# Patient Record
Sex: MALE | Race: WHITE | Employment: STUDENT | URBAN - METROPOLITAN AREA
[De-identification: names, ages, dates, MRNs, and addresses within clinical notes are randomized per-mention and may not be internally consistent; named-entity substitution may affect disease eponyms.]

---
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
## 2024-02-01 ENCOUNTER — APPOINTMENT (OUTPATIENT)
Dept: GENERAL RADIOLOGY | Age: 24
End: 2024-02-01
Attending: EMERGENCY MEDICINE
Payer: COMMERCIAL

## 2024-02-01 ENCOUNTER — HOSPITAL ENCOUNTER (OUTPATIENT)
Age: 24
Discharge: HOME | End: 2024-02-01
Attending: EMERGENCY MEDICINE
Payer: COMMERCIAL

## 2024-02-01 VITALS
WEIGHT: 150 LBS | OXYGEN SATURATION: 97 % | DIASTOLIC BLOOD PRESSURE: 87 MMHG | HEART RATE: 100 BPM | SYSTOLIC BLOOD PRESSURE: 136 MMHG | HEIGHT: 68 IN | TEMPERATURE: 99 F | RESPIRATION RATE: 20 BRPM | BODY MASS INDEX: 22.73 KG/M2

## 2024-02-01 DIAGNOSIS — S93.409A: Primary | ICD-10-CM

## 2024-02-01 PROCEDURE — 29515 APPLICATION SHORT LEG SPLINT: CPT

## 2024-02-01 PROCEDURE — 99204 OFFICE O/P NEW MOD 45 MIN: CPT

## 2024-02-01 PROCEDURE — 73610 X-RAY EXAM OF ANKLE: CPT | Performed by: EMERGENCY MEDICINE

## 2024-02-01 RX ORDER — HYDROCODONE BITARTRATE AND ACETAMINOPHEN 5; 325 MG/1; MG/1
1-2 TABLET ORAL EVERY 6 HOURS PRN
Qty: 10 TABLET | Refills: 0 | Status: SHIPPED | OUTPATIENT
Start: 2024-02-01 | End: 2024-02-06

## 2024-02-01 NOTE — ED INITIAL ASSESSMENT (HPI)
Patient c/o injury to right ankle pain and swelling. Patient sustained injury while jumping during a [employer]is[employer] game.

## 2024-02-01 NOTE — ED PROVIDER NOTES
Patient Seen in: [facility]      History     Chief Complaint   Patient presents with    Ankle Injury     Stated Complaint: Ankle Injury    Subjective:   HPI    Patient complains of injury to the right ankle with pain and swelling there.  He jumped and landed with his foot inverting under him during a Frisbee game.  Complains of pain with swelling and bruising noted over the lateral aspect of the ankle and limited weightbearing.  Patient notes that he had an injury to this ankle just a few weeks ago.    Objective:   Past Medical History:   Diagnosis Date    Asthma     Extrinsic asthma, unspecified               Past Surgical History:   Procedure Laterality Date    HC IMPLANT EAR TUBES      REMOVAL ADENOIDS,PRIMARY,<13 Y/O                  Social History     Socioeconomic History    Marital status: Single   Tobacco Use    Smoking status: Never    Smokeless tobacco: Never   Substance and Sexual Activity    Alcohol use: No    Drug use: No              Review of Systems    Positive for stated complaint: Ankle Injury  Other systems are as noted in HPI.  Constitutional and vital signs reviewed.      All other systems reviewed and negative except as noted above.    Physical Exam     ED Triage Vitals [02/01/24 1743]   /87   Pulse 100   Resp 20   Temp 98.6 °F (37 °C)   Temp src Oral   SpO2 97 %   O2 Device None (Room air)       Current:/87   Pulse 100   Temp 98.6 °F (37 °C) (Oral)   Resp 20   Ht 172.7 cm (5' 8\")   Wt 68 kg   SpO2 97%   BMI 22.81 kg/m²         Physical Exam  Ankle: On inspection, there is soft tissue swelling noted over the lateral malleolus and just inferior and anterior to this.  The medial malleolus, Achilles, calcaneus, proximal fifth metatarsal, and proximal fibula are nontender.  Dorsalis pedis pulse intact.  Discomfort intact.  Distal sensation intact light touch       ED Course   Labs Reviewed - No data to display                   MDM     Patient has sustained an  inversion injury to the ankle.  He has significant swelling, tenderness, and limited weightbearing this is at least 1/3 degree sprain. .  Fracture include the differential    Patient offered pain medication      X-ray ankle  IMPRESSION:   There is likely an accessory ossicle adjacent to the caudal aspect of the lateral malleolus.  This is unlikely to represent an avulsion fracture.  Soft tissue swelling over the lateral malleolus.       I recommend splinting, elevation, cool compresses, crutches with no weightbearing.  I recommend orthopedic follow-up.  Patient should call tomorrow for an appointment to be seen next week.    A stirrup splint was applied and neurovascular status assessed afterwards was normal.  Splint was applied for purposes of immobilization to facilitate healing and patient comfort                           Medical Decision Making      Disposition and Plan     Clinical Impression:  1. Third degree ankle sprain         Disposition:  Discharge  2/1/2024  6:18 pm    Follow-up:  David Francisco PA  [address] [place] [address]  [phone]    Call in 1 day            Medications Prescribed:  Current Discharge Medication List        START taking these medications    Details   HYDROcodone-acetaminophen 5-325 MG Oral Tab Take 1-2 tablets by mouth every 6 (six) hours as needed for Pain.  Qty: 10 tablet, Refills: 0    Associated Diagnoses: Third degree ankle sprain

## 2024-02-02 NOTE — DISCHARGE INSTRUCTIONS
Crutches with no weightbearing  Rest  Elevate your injured extremity  Apply cool compresses for 20 minutes at a time.  Tylenol or motrin for pain  Norco for severe pain  Follow up with orthopedic specialist.  Call tomorrow for an appointment to be seen next week

## 2024-02-06 ENCOUNTER — OFFICE VISIT (OUTPATIENT)
Dept: ORTHOPEDICS CLINIC | Facility: CLINIC | Age: 24
End: 2024-02-06
Payer: COMMERCIAL

## 2024-02-06 ENCOUNTER — HOSPITAL ENCOUNTER (OUTPATIENT)
Dept: GENERAL RADIOLOGY | Age: 24
Discharge: HOME | End: 2024-02-06
Attending: PODIATRIST
Payer: COMMERCIAL

## 2024-02-06 VITALS — BODY MASS INDEX: 22.73 KG/M2 | WEIGHT: 150 LBS | HEIGHT: 68 IN

## 2024-02-06 DIAGNOSIS — S93.491A HIGH ANKLE SPRAIN OF RIGHT LOWER EXTREMITY, INITIAL ENCOUNTER: ICD-10-CM

## 2024-02-06 DIAGNOSIS — S82.831A OTHER CLOSED FRACTURE OF DISTAL END OF RIGHT FIBULA, INITIAL ENCOUNTER: Primary | ICD-10-CM

## 2024-02-06 DIAGNOSIS — S82.831A OTHER CLOSED FRACTURE OF DISTAL END OF RIGHT FIBULA, INITIAL ENCOUNTER: ICD-10-CM

## 2024-02-06 DIAGNOSIS — S93.421A SPRAIN OF DELTOID LIGAMENT OF RIGHT ANKLE, INITIAL ENCOUNTER: ICD-10-CM

## 2024-02-06 PROCEDURE — 73600 X-RAY EXAM OF ANKLE: CPT | Performed by: PODIATRIST

## 2024-02-06 PROCEDURE — 3008F BODY MASS INDEX DOCD: CPT | Performed by: PODIATRIST

## 2024-02-06 PROCEDURE — 99204 OFFICE O/P NEW MOD 45 MIN: CPT | Performed by: PODIATRIST

## 2024-02-06 NOTE — PROGRESS NOTES
[facility] New Patient Note    CC:   Chief Complaint   Patient presents with    Leg or Foot Injury     Right ankle injury;   No real explanation whether it is a fx or old/growth thing;   DOI: 02/01/24;  Playing ultimate frisbee jumped and landed funny;   Pain Score: 4-5;  Non WB        HPI: The patient is a 23 year old male who presents today with complaints of an injury about 5 days ago when he was playing ultimate Frisbee.  He jumped up to grab the Upsidebee and came down on someone else's foot.  He was seen at an urgent care and is here for follow-up.  He has been nonweightbearing with a James splint and crutches.    Recent 6 months ago, ankle sprain of the same ankle  No PT  Hx of ankle sprains     Past Medical History:   Diagnosis Date    Asthma     Extrinsic asthma, unspecified      Past Surgical History:   Procedure Laterality Date    HC IMPLANT EAR TUBES      REMOVAL ADENOIDS,PRIMARY,<11 Y/O       Current Outpatient Medications   Medication Sig Dispense Refill    ibuprofen 400 MG Oral Tab Take 1 tablet (400 mg total) by mouth every 6 (six) hours as needed for Pain.      Albuterol Sulfate HFA (PROAIR HFA) 108 (90 BASE) MCG/ACT Inhalation Aero Soln Inhale 2 puffs into the lungs every 4 (four) hours as needed for Wheezing. 1 Inhaler 0    HYDROcodone-acetaminophen 5-325 MG Oral Tab Take 1-2 tablets by mouth every 6 (six) hours as needed for Pain. (Patient not taking: Reported on 2/6/2024) 10 tablet 0     No Known Allergies  Family History   Problem Relation Age of Onset    Cancer Maternal Grandmother     Heart Disease Maternal Grandmother     Stroke Neg      Social History     Occupational History    Not on file   Tobacco Use    Smoking status: Never    Smokeless tobacco: Never   Substance and Sexual Activity    Alcohol use: No    Drug use: No    Sexual activity: Not on file        ROS:  Complete ROS reviewed by me and non-contributory to the chief complaint except as mentioned above.    Physical Exam:     Ht 5' 8\" (1.727 m)   Wt 150 lb (68 kg)   BMI 22.81 kg/m²   Exam right foot no pain  Exam right ankle  Pain at anterior inferior syndesmotic ligament   Pain medially, at deltoid fibers  Pain at the distal fibula    Moderate swelling and ecchymosis   No pain proximally of leg, no calf pain  No pain with squeezing of distal tibia/fibula  Strength Testing deferred  Palpable pedal pulses and foot is warm.  He can feel light touch to the toes    Imaging:  avulsion fragment distal fibula, 3 views right ankle  Subtle widening medial gutter    Stress view right ankle -no widening of the medial clear space with stress       personally viewed, independently interpreted and radiology report read.      Assessment/Diagnoses:  Diagnoses and all orders for this visit:    Other closed fracture of distal end of right fibula, initial encounter  -     Cancel: XR ANKLE (MIN 3 VIEWS), RIGHT (CPT=73610); Future    High ankle sprain of right lower extremity, initial encounter    Sprain of deltoid ligament of right ankle, initial encounter        Plan:  I reviewed imaging and exam findings with the patient and his dad with him today.  He has a high ankle sprain and the avulsion fragment off the distal fibula.  He also has a medial deltoid sprain and we looked at the x-rays together as well as the stress view together which shows no increased widening medially of the clear space.  This is a non surgical treatment now  The type of fracture is discussed with the patient including anatomy, etiology, and location.  This can be treated conservatively with immobilization in a high profile boot they have at home  Weight bearing status discussed -WB as tolerated  Time to healing up to 6-8 weeks is anticipated.  Discussed delayed healing and potential for non union, arthritis, and continued pain.      Will plan for PT for ankle rehab  Hx of frequent sprains in past of this same ankle    Elevation, use of ace wrap and icing  Nsaids use as  well          They have a boot(high) type at home that he can go into now    Follow up in 1 month, 3 view xrays of ankle - NWB views for comparison          Susan Larios DPM  [facility]      This document was partially prepared using Dragon Medical voice recognition software.

## 2024-03-04 ENCOUNTER — TELEPHONE (OUTPATIENT)
Dept: ORTHOPEDICS CLINIC | Facility: CLINIC | Age: 24
End: 2024-03-04

## 2024-03-04 DIAGNOSIS — M25.571 RIGHT ANKLE PAIN, UNSPECIFIED CHRONICITY: Primary | ICD-10-CM

## 2024-03-05 ENCOUNTER — HOSPITAL ENCOUNTER (OUTPATIENT)
Dept: GENERAL RADIOLOGY | Age: 24
Discharge: HOME | End: 2024-03-05
Attending: PODIATRIST
Payer: COMMERCIAL

## 2024-03-05 ENCOUNTER — OFFICE VISIT (OUTPATIENT)
Dept: ORTHOPEDICS CLINIC | Facility: CLINIC | Age: 24
End: 2024-03-05
Payer: COMMERCIAL

## 2024-03-05 VITALS — HEIGHT: 68 IN | WEIGHT: 150 LBS | BODY MASS INDEX: 22.73 KG/M2

## 2024-03-05 DIAGNOSIS — T14.8XXA AVULSION FRACTURE: ICD-10-CM

## 2024-03-05 DIAGNOSIS — S93.421A SPRAIN OF DELTOID LIGAMENT OF RIGHT ANKLE, INITIAL ENCOUNTER: Primary | ICD-10-CM

## 2024-03-05 DIAGNOSIS — M25.571 RIGHT ANKLE PAIN, UNSPECIFIED CHRONICITY: ICD-10-CM

## 2024-03-05 PROCEDURE — 73610 X-RAY EXAM OF ANKLE: CPT | Performed by: PODIATRIST

## 2024-03-05 PROCEDURE — 3008F BODY MASS INDEX DOCD: CPT | Performed by: PODIATRIST

## 2024-03-05 PROCEDURE — 99213 OFFICE O/P EST LOW 20 MIN: CPT | Performed by: PODIATRIST

## 2024-03-05 NOTE — PROGRESS NOTES
[facility] Progress Note    Subjective:     Nikunj is here for follow-up now about 1 month post injury to his right ankle.  He has a little bit of pain on the medial aspect of the ankle but overall is improving.  He comes in today in his tennis shoes and full weightbearing as recommended.  He is going through physical therapy with a  at his school        Objective:     Exam mild tenderness at the medial malleolus no pain along the deltoid ligament fibers.  Minimal pain about the lateral ankle          Imaging: X-rays show the avulsion fragment off the distal fibula to be stable        Assessment/Plan:     Diagnoses and all orders for this visit:    Sprain of deltoid ligament of right ankle, initial encounter    Avulsion fracture        Continue ankle rehab with his   In 2 to 3 weeks if he is still having discomfort reach out and we would order an MRI.  Overall he seems to be improving  Follow-up as needed        Susan Larios DPM  [facility]    Kickplay speech recognition software was used to prepare this note. If a word or phrase is confusing, it is likely do to a failure of recognition. Please contact me with any questions or clarifications.

## 2024-10-14 ENCOUNTER — APPOINTMENT (OUTPATIENT)
Dept: GENERAL RADIOLOGY | Age: 24
End: 2024-10-14
Attending: EMERGENCY MEDICINE
Payer: COMMERCIAL

## 2024-10-14 ENCOUNTER — HOSPITAL ENCOUNTER (OUTPATIENT)
Age: 24
Discharge: HOME | End: 2024-10-14
Attending: EMERGENCY MEDICINE
Payer: COMMERCIAL

## 2024-10-14 VITALS
RESPIRATION RATE: 18 BRPM | HEIGHT: 67 IN | OXYGEN SATURATION: 99 % | DIASTOLIC BLOOD PRESSURE: 87 MMHG | WEIGHT: 160 LBS | BODY MASS INDEX: 25.11 KG/M2 | TEMPERATURE: 98 F | HEART RATE: 65 BPM | SYSTOLIC BLOOD PRESSURE: 130 MMHG

## 2024-10-14 DIAGNOSIS — S63.617A SPRAIN OF LEFT LITTLE FINGER, UNSPECIFIED SITE OF DIGIT, INITIAL ENCOUNTER: Primary | ICD-10-CM

## 2024-10-14 PROCEDURE — 99213 OFFICE O/P EST LOW 20 MIN: CPT

## 2024-10-14 PROCEDURE — 73140 X-RAY EXAM OF FINGER(S): CPT | Performed by: EMERGENCY MEDICINE

## 2024-10-14 NOTE — ED PROVIDER NOTES
Patient Seen in: [facility]      History     Chief Complaint   Patient presents with    Finger Injury     Stated Complaint: INJ TO FINGER    Subjective:   HPI      24-year-old male complaining of injury to left fifth finger patient hyperextended this while wrestling about 4 days ago.  He denies any other injury.    Objective:     Past Medical History:    Asthma (HCC)    Extrinsic asthma, unspecified              Past Surgical History:   Procedure Laterality Date    Hc implant ear tubes      Removal adenoids,primary,<13 y/o                  No pertinent social history.            Review of Systems    Positive for stated complaint: INJ TO FINGER  Other systems are as noted in HPI.  Constitutional and vital signs reviewed.      All other systems reviewed and negative except as noted above.    Physical Exam     ED Triage Vitals [10/14/24 1103]   /87   Pulse 65   Resp 18   Temp 97.9 °F (36.6 °C)   Temp src Temporal   SpO2 99 %   O2 Device None (Room air)       Current Vitals:   Vital Signs  BP: 130/87  Pulse: 65  Resp: 18  Temp: 97.9 °F (36.6 °C)  Temp src: Temporal    Oxygen Therapy  SpO2: 99 %  O2 Device: None (Room air)        Physical Exam  Patient is alert and orient x 3 no acute distress left fourth finger there is swelling which is mild to moderate with some tenderness over the volar aspect of the proximal and middle aspect of the fifth finger on the left hand neurovascular is fully intact there is flexion extension grossly intact there is slight decreased range of motion with flexion.  Sensation intact cap refills normal.    ED Course   Labs Reviewed - No data to display         XR FINGER(S) (MIN 2 VIEWS), LEFT 5TH (CPT=73140)    Result Date: 10/14/2024  CONCLUSION:   Negative for fracture or malalignment.  Normal mineralization.  Joint spaces are preserved.  No periarticular erosions.  No focal soft tissue swelling.    LOCATION:  [facility]   Dictated by (CST): Donato Alonso MD on 10/14/2024 at  11:37 AM     Finalized by (CST): Donato Alonso MD on 10/14/2024 at 11:38 AM      Images independently reviewed there is no fracture       MDM      Initial differential diagnosis considered but not limited to includes sprain fracture contusion dislocation        Medical Decision Making  Patient has sprain finger he was buddy taped advised to follow-up as needed return if worse    Disposition and Plan     Clinical Impression:  1. Sprain of left little finger, unspecified site of digit, initial encounter         Disposition:  Discharge  10/14/2024 11:50 am    Follow-up:  Isaac nAderson MD  [address] [address]  [phone]    In 1 week  As needed          Medications Prescribed:  Discharge Medication List as of 10/14/2024 11:52 AM              Supplementary Documentation:

## 2024-10-14 NOTE — DISCHARGE INSTRUCTIONS
Buddy tape the finger to the finger next to it for about 1 to 2 weeks as needed.  Tylenol Advil ice rest

## (undated) NOTE — LETTER
Date & Time: 4/30/2019, 8:49 AM  Patient: Karsten Tao  Encounter Provider(s):    Douglas Russ MD       To Whom It May Concern:    Kaylene Degroot was seen and treated in our department on 4/30/2019.  He should not return to school until 5/1/2019,

## (undated) NOTE — LETTER
Date: 2/6/2024    Patient Name: Nikunj Hendrickson          To Whom it may concern:    This letter has been written at the patient's request. The above patient was seen at the [facility] for treatment of a medical condition.    The patient may return to work/school on 02/07/24 with the following limitations: must be allowed to wear orthopedic cam boot until further notice and reevaluation.        Sincerely,    Susan Larios DPM

## (undated) NOTE — ED AVS SNAPSHOT
[facility] Emergency Department    [address] [place] [address]    Phone:  [phone]    Fax:  [phone] [facility]   MRN: [id number]    Department:  [facility] Emergency Department   Date of Visit:  1/1 IF THERE IS ANY CHANGE OR WORSENING OF YOUR CONDITION, CALL YOUR PRIMARY CARE PHYSICIAN AT ONCE OR RETURN IMMEDIATELY TO THE EMERGENCY DEPARTMENT.     If you have been prescribed any medication(s), please fill your prescription right away and begin taking t

## (undated) NOTE — ED AVS SNAPSHOT
[facility] in [address] [place] [address] Drive,[address] Floor    [address] [place] [address]    Phone:  [phone]    Fax:  [phone]   MRN: [id number]    Department:  [facility] in [facility] [place]   Date of Visit:  1/19/2017 If you have any problems with your follow-up, please call our  at [phone]. Si usted tiene algun problema con rivera sequimiento, por favor llame a nuestro adminstrador de casos al [phone].     Expect to receive an electronic reques Clemencia Richardson [address] [facility] ([address]) [address] [facility] [address]. ([address] [facility] [address]) [address]  ([address]) [address] [facility] Sign Up Forms link in the Additional Information box on the right. QUIQ Questions? Call [phone] for help. QUIQ is NOT to be used for urgent needs. For medical emergencies, dial 911.

## (undated) NOTE — ED AVS SNAPSHOT
Floresita Maddox   MRN: [id number]    Department:  [facility] Emergency Department   Date of Visit:  10/7/2017           Disclosure     Insurance plans vary and the physician(s) referred by the ER may not be covered by your plan.  Please contact yo If you have been prescribed any medication(s), please fill your prescription right away and begin taking the medication(s) as directed    If the emergency physician has read X-rays, these will be re-interpreted by a radiologist.  If there is a significant

## (undated) NOTE — ED AVS SNAPSHOT
[facility] Emergency Department    [address] [place] [address]    Phone:  [phone]    Fax:  [phone] [facility]   MRN: [id number]    Department:  [facility] Emergency Department   Date of Visit:  1/1 To Check ER Wait Times:  TEXT 'ERwait' to 24134      Click [email or web address]      Or call [phone]    If you have any problems with your follow-up, please call our  at [phone]    Si usted tiene algun problema con rivera sequimiento, por f I have read and understand the instructions given to me by my caregivers. 24-Hour Pharmacies        Pharmacy Address Phone Number   [address] [facility] ([address]) [address] [place] [address] 289.  [facility] & visit, view other health information and more. To sign up or find more information on getting   Proxy Access to your child’s MyChart go to [email or web address] and click on the   Sign Up Forms link in the Additional Information box on the right.

## (undated) NOTE — ED AVS SNAPSHOT
Katalina Jim   MRN: [id number]    Department:  [facility] Emergency Department   Date of Visit:  3/14/2018           Disclosure     Insurance plans vary and the physician(s) referred by the ER may not be covered by your plan.  Please contact yo tell this physician (or your personal doctor if your instructions are to return to your personal doctor) about any new or lasting problems. The primary care or specialist physician will see patients referred from the [facility] Emergency Department.  Cathie Gerber

## (undated) NOTE — ED AVS SNAPSHOT
Kelley Oneill   MRN: [id number]    Department:  [facility] Emergency Department   Date of Visit:  4/18/2019           Disclosure     Insurance plans vary and the physician(s) referred by the ER may not be covered by your plan.  Please contact yo tell this physician (or your personal doctor if your instructions are to return to your personal doctor) about any new or lasting problems. The primary care or specialist physician will see patients referred from the [facility] Emergency Department.  Trish Anderson

## (undated) NOTE — LETTER
March 14, 2018    Patient: Barbara Law   Date of Visit: 3/14/2018       To Whom It May Concern:    Willie Butts was seen and treated in our emergency department on 3/14/2018. He should not participate in gym/sports until Cleared by a physician.

## (undated) NOTE — ED AVS SNAPSHOT
Juan Milton   MRN: [id number]    Department:  [facility] Emergency Department   Date of Visit:  4/30/2019           Disclosure     Insurance plans vary and the physician(s) referred by the ER may not be covered by your plan.  Please contact yo tell this physician (or your personal doctor if your instructions are to return to your personal doctor) about any new or lasting problems. The primary care or specialist physician will see patients referred from the [facility] Emergency Department.  Demian Fang